# Patient Record
Sex: FEMALE | Race: WHITE | URBAN - METROPOLITAN AREA
[De-identification: names, ages, dates, MRNs, and addresses within clinical notes are randomized per-mention and may not be internally consistent; named-entity substitution may affect disease eponyms.]

---

## 2022-12-30 ENCOUNTER — OFFICE VISIT (OUTPATIENT)
Dept: URBAN - METROPOLITAN AREA CLINIC 6 | Facility: CLINIC | Age: 37
End: 2022-12-30
Payer: COMMERCIAL

## 2022-12-30 DIAGNOSIS — H04.123 TEAR FILM INSUFFICIENCY OF BILATERAL LACRIMAL GLANDS: ICD-10-CM

## 2022-12-30 DIAGNOSIS — B00.59 OTHER HERPESVIRAL DISEASE OF EYE: Primary | ICD-10-CM

## 2022-12-30 DIAGNOSIS — H43.393 OTHER VITREOUS OPACITIES, BILATERAL: ICD-10-CM

## 2022-12-30 DIAGNOSIS — H17.9 CORNEAL SCAR: ICD-10-CM

## 2022-12-30 PROCEDURE — 92004 COMPRE OPH EXAM NEW PT 1/>: CPT | Performed by: OPTOMETRIST

## 2022-12-30 RX ORDER — ACYCLOVIR 50 MG/G
5 % OINTMENT TOPICAL
Qty: 3.5 | Refills: 0 | Status: ACTIVE
Start: 2022-12-30

## 2022-12-30 ASSESSMENT — INTRAOCULAR PRESSURE
OD: 13
OS: 16

## 2022-12-30 NOTE — IMPRESSION/PLAN
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 Plan: HSV dermatitis OU - continue oral antiviral as Rx by an outside dr. Méndez [place] acyclovir tahir QID to affected areas and discussed good hygiene. Recheck at next visit.

## 2023-01-12 ENCOUNTER — OFFICE VISIT (OUTPATIENT)
Dept: URBAN - METROPOLITAN AREA CLINIC 6 | Facility: CLINIC | Age: 38
End: 2023-01-12
Payer: COMMERCIAL

## 2023-01-12 DIAGNOSIS — B00.59 OTHER HERPESVIRAL DISEASE OF EYE: Primary | ICD-10-CM

## 2023-01-12 PROCEDURE — 92012 INTRM OPH EXAM EST PATIENT: CPT | Performed by: OPTOMETRIST

## 2023-01-12 ASSESSMENT — INTRAOCULAR PRESSURE
OD: 14
OS: 12

## 2023-01-12 NOTE — IMPRESSION/PLAN
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 Plan: HSV dermatitis - Resolving. Lingering scabs. Advised to not pick at the scabs. Pt was unable to obtain Elvira Miller tahir due to cost. However, pt did finish generic Valtrex BID for 7 days (was Rx by PCP).